# Patient Record
Sex: FEMALE | HISPANIC OR LATINO | Employment: FULL TIME | URBAN - METROPOLITAN AREA
[De-identification: names, ages, dates, MRNs, and addresses within clinical notes are randomized per-mention and may not be internally consistent; named-entity substitution may affect disease eponyms.]

---

## 2021-11-15 ENCOUNTER — HOSPITAL ENCOUNTER (EMERGENCY)
Facility: HOSPITAL | Age: 46
Discharge: HOME | End: 2021-11-15
Admitting: EMERGENCY MEDICINE
Payer: COMMERCIAL

## 2021-11-15 VITALS — TEMPERATURE: 98.5 F | RESPIRATION RATE: 16 BRPM | HEART RATE: 85 BPM | OXYGEN SATURATION: 98 %

## 2021-11-15 DIAGNOSIS — S61.211A LACERATION OF LEFT INDEX FINGER WITHOUT FOREIGN BODY WITHOUT DAMAGE TO NAIL, INITIAL ENCOUNTER: ICD-10-CM

## 2021-11-15 PROCEDURE — 99282 EMERGENCY DEPT VISIT SF MDM: CPT

## 2021-11-15 PROCEDURE — 99283 EMERGENCY DEPT VISIT LOW MDM: CPT | Mod: 25

## 2021-11-15 PROCEDURE — 90715 TDAP VACCINE 7 YRS/> IM: CPT | Performed by: EMERGENCY MEDICINE

## 2021-11-15 PROCEDURE — 272N000047 HC ADHESIVE DERMABOND SKIN

## 2021-11-15 PROCEDURE — 90471 IMMUNIZATION ADMIN: CPT | Performed by: EMERGENCY MEDICINE

## 2021-11-15 PROCEDURE — 12001 RPR S/N/AX/GEN/TRNK 2.5CM/<: CPT

## 2021-11-15 PROCEDURE — 250N000011 HC RX IP 250 OP 636: Performed by: EMERGENCY MEDICINE

## 2021-11-15 RX ADMIN — CLOSTRIDIUM TETANI TOXOID ANTIGEN (FORMALDEHYDE INACTIVATED), CORYNEBACTERIUM DIPHTHERIAE TOXOID ANTIGEN (FORMALDEHYDE INACTIVATED), BORDETELLA PERTUSSIS TOXOID ANTIGEN (GLUTARALDEHYDE INACTIVATED), BORDETELLA PERTUSSIS FILAMENTOUS HEMAGGLUTININ ANTIGEN (FORMALDEHYDE INACTIVATED), BORDETELLA PERTUSSIS PERTACTIN ANTIGEN, AND BORDETELLA PERTUSSIS FIMBRIAE 2/3 ANTIGEN 0.5 ML: 5; 2; 2.5; 5; 3; 5 INJECTION, SUSPENSION INTRAMUSCULAR at 19:43

## 2021-11-16 NOTE — DISCHARGE INSTRUCTIONS
Please do get the wound wet until tomorrow.  The glue will dissolve and the steri strip will fall off over the next week. Follow up with your primary care physician or return back to the ED for any worsening signs of infection or increasing pain.

## 2021-11-16 NOTE — ED PROVIDER NOTES
ED Provider In Triage Note  [facility]  Encounter Date: Nov 15, 2021    No chief complaint on file.      Brief HPI:   Irena Akhtar is a 46 year old female presenting to the Emergency Department for evaluation of a left index finger laceration that occurred after she accidentally cut it with scissors today at school.  The patient presented to the ED for evaluation because of persistent bleeding.  The patient denied any other injuries.  Tetanus status was unknown. The patient is otherwise healthy.     ROS: Patient denies any numbness or weakness in the left index finger.     Social: No reported alcohol use.     Brief Physical Exam:  Pulse 85   Temp 98.5  F (36.9  C)   Resp 16   LMP 10/17/2021   SpO2 98%   General presentation: Alert, Vital signs reviewed. No apparent distress.   HENT: ENT inspection is normal. Oropharynx is moist and clear.   Eye: Pupils are equal and reactive to light. EOMI  Neck: The neck is supple.  Pulmonary: Currently in no acute respiratory distress.   Circulatory: Peripheral pulses are strong and equal in the bilateral upper lower extremities.   Neurologic: Alert, oriented to person, place, and time. No gross motor or sensory deficits noted in the upper or lower extremities. Cranial nerves II through XII are grossly intact.  Musculoskeletal: No extremity tenderness. Full range of motion in all extremities. No extremity edema.   Skin: 5 mm superficial laceration noted on palmar surface of the distal tip of the left index finger.  Oozing noted from the laceration.     [facility]    -Laceration Repair    Date/Time: 11/15/2021 6:50 PM  Performed by: Maria De Jesus Jaquez DO  Authorized by: Maria De Jesus Jaquez DO       ANESTHESIA (see MAR for exact dosages):     Anesthesia method:  None  LACERATION DETAILS     Location:  Finger    Finger location:  L index finger    Length (cm):  0.5  EXPLORATION:     Hemostasis achieved with:  Direct  pressure    Wound extent: no foreign body and no tendon damage      Contaminated: no      TREATMENT:     Area cleansed with:  Saline    SKIN REPAIR     Repair method:  Tissue adhesive    APPROXIMATION     Approximation:  Close    POST-PROCEDURE DETAILS     Dressing:  Non-adherent dressing        MDM  The patient's superficial laceration was repaired here using skin glue and steri strips.  Hemostasis was achieved with the placement of the skin glue and steri strips.  The patient was educated about proper wound care and reassured. She was instructed to watch for signs of worsening infection.  The patient was given a tetanus shot here in the ED.       IMPRESSION  1. Laceration of left index finger without foreign body without damage to nail, initial encounter            Maria De Jesus Jaquez DO on 11/15/2021 at 6:50 PM    Patient was evaluated by the Physician in Triage due to a limitation of available rooms in the Emergency Department. A plan of care was discussed based on the information obtained on the initial evaluation and patient was consuled to return back to the Emergency Department lobby after this initial evalutaiton until results were obtained or a room became available in the Emergency Department. Patient was counseled not to leave prior to receiving the results of their workup.      Maria De Jesus Jaquez DO  11/15/21 1910

## 2021-11-16 NOTE — ED TRIAGE NOTES
Pt reports laceration tip of L index finger at 1300 today while working with scissors today. Not on thinners.

## 2024-11-16 ENCOUNTER — OFFICE VISIT (OUTPATIENT)
Dept: URGENT CARE | Facility: URGENT CARE | Age: 49
End: 2024-11-16
Payer: COMMERCIAL

## 2024-11-16 VITALS
HEART RATE: 104 BPM | RESPIRATION RATE: 14 BRPM | BODY MASS INDEX: 30.11 KG/M2 | SYSTOLIC BLOOD PRESSURE: 126 MMHG | TEMPERATURE: 97.9 F | WEIGHT: 170 LBS | OXYGEN SATURATION: 96 % | DIASTOLIC BLOOD PRESSURE: 83 MMHG

## 2024-11-16 DIAGNOSIS — G43.009 MIGRAINE WITHOUT AURA AND WITHOUT STATUS MIGRAINOSUS, NOT INTRACTABLE: Primary | ICD-10-CM

## 2024-11-16 DIAGNOSIS — R03.0 ELEVATED BP WITHOUT DIAGNOSIS OF HYPERTENSION: ICD-10-CM

## 2024-11-16 PROBLEM — G89.29 CHRONIC LEFT SHOULDER PAIN: Status: ACTIVE | Noted: 2023-04-21

## 2024-11-16 PROBLEM — E11.9 TYPE 2 DIABETES MELLITUS WITHOUT COMPLICATION, WITHOUT LONG-TERM CURRENT USE OF INSULIN (H): Status: ACTIVE | Noted: 2023-02-13

## 2024-11-16 PROBLEM — M25.512 CHRONIC LEFT SHOULDER PAIN: Status: ACTIVE | Noted: 2023-04-21

## 2024-11-16 RX ORDER — GLIPIZIDE 2.5 MG/1
2.5 TABLET, EXTENDED RELEASE ORAL DAILY
COMMUNITY
Start: 2024-10-11

## 2024-11-16 NOTE — PROGRESS NOTES
Patient presents with:  Hypertension  Headache: For a few days and has been checking her blood pressure and it has been  going up recently.       Clinical Decision Making:      ICD-10-CM    1. Migraine without aura and without status migrainosus, not intractable  G43.009       2. Elevated BP without diagnosis of hypertension  R03.0         49-year-old female with a history of diabetes who is here for concern of headaches and elevated blood pressure at home.  Headache is now gone after Tylenol ibuprofen that she took at home.  She reports temporal head pain associated with photosensitivity but that has resolved.  She checked her blood pressure at [facility] and found him to be 125/75 and then again 135/95.  Her vitals are normal here.  Reassured patient.  No other acute finding on exam.  I explained to patient that sometimes with headache blood pressure can increase but her values are not of concern.     There are no Patient Instructions on file for this visit.    HPI:  Irena Akhtar is a 49 year old female who presents today complaining of   Chief Complaint   Patient presents with    Hypertension    Headache     For a few days and has been checking her blood pressure and it has been  going up recently.          History obtained from the patient.    Problem List:  2023-04: Chronic left shoulder pain  2023-02: Type 2 diabetes mellitus without complication, without long-  term current use of insulin (H)      History reviewed. No pertinent past medical history.    Social History     Tobacco Use    Smoking status: Never    Smokeless tobacco: Never   Substance Use Topics    Alcohol use: No       Review of Systems  Constitutional, HEENT, cardiovascular, pulmonary, gi and gu systems are negative, except as otherwise noted.    Vitals:    11/16/24 1310   BP: 126/83   Pulse: 104   Resp: 14   Temp: 97.9  F (36.6  C)   TempSrc: Oral   SpO2: 96%   Weight: 77.1 kg (170 lb)       Physical Exam    GENERAL: healthy, alert and no  distress  HEENT: Tms normal, posterior oropharynx normal, no sinus tenderness.   NECK: no adenopathy, no asymmetry, masses, or scars and thyroid normal to palpation  RESP: lungs clear to auscultation - no rales, rhonchi or wheezes  CV: regular rate and rhythm, normal S1 S2, no S3 or S4, no murmur, click or rub  MS: no gross musculoskeletal defects noted, no edema  NEURO: Normal strength and tone, mentation intact and speech normal  PSYCH: mentation appears normal, affect normal/bright    Results:  No results found for any visits on 11/16/24.      At the end of the encounter, I discussed results, diagnosis, medications. Discussed red flags for immediate return to clinic/ER, as well as indications for follow up if no improvement. Patient understood and agreed to plan. Patient was stable for discharge.

## 2024-11-26 ENCOUNTER — OFFICE VISIT (OUTPATIENT)
Dept: INTERNAL MEDICINE | Facility: CLINIC | Age: 49
End: 2024-11-26
Payer: COMMERCIAL

## 2024-11-26 VITALS
RESPIRATION RATE: 16 BRPM | WEIGHT: 164.8 LBS | OXYGEN SATURATION: 97 % | HEIGHT: 63 IN | BODY MASS INDEX: 29.2 KG/M2 | DIASTOLIC BLOOD PRESSURE: 81 MMHG | HEART RATE: 99 BPM | TEMPERATURE: 98.1 F | SYSTOLIC BLOOD PRESSURE: 123 MMHG

## 2024-11-26 DIAGNOSIS — Z12.11 SCREEN FOR COLON CANCER: ICD-10-CM

## 2024-11-26 DIAGNOSIS — E11.9 TYPE 2 DIABETES MELLITUS WITHOUT COMPLICATION, WITHOUT LONG-TERM CURRENT USE OF INSULIN (H): Primary | ICD-10-CM

## 2024-11-26 DIAGNOSIS — Z12.31 VISIT FOR SCREENING MAMMOGRAM: ICD-10-CM

## 2024-11-26 DIAGNOSIS — Z00.00 ENCOUNTER FOR PREVENTIVE CARE: ICD-10-CM

## 2024-11-26 RX ORDER — PROCHLORPERAZINE 25 MG/1
SUPPOSITORY RECTAL
Qty: 1 EACH | Refills: 0 | Status: SHIPPED | OUTPATIENT
Start: 2024-11-26

## 2024-11-26 RX ORDER — PROCHLORPERAZINE 25 MG/1
SUPPOSITORY RECTAL
Qty: 3 EACH | Refills: 5 | Status: SHIPPED | OUTPATIENT
Start: 2024-11-26

## 2024-11-26 RX ORDER — PROCHLORPERAZINE 25 MG/1
SUPPOSITORY RECTAL
Qty: 1 EACH | Refills: 1 | Status: SHIPPED | OUTPATIENT
Start: 2024-11-26

## 2024-11-26 NOTE — PROGRESS NOTES
Assessment and Plan     Ms. Libra Akhtar is a 49 year old female with history of T2DM seen to [facility].      (E11.9) Type 2 diabetes mellitus without complication, without long-term current use of insulin (H)  (primary encounter diagnosis)  Comment:    Plan: HEMOGLOBIN A1C,   Lipid panel reflex to direct LDL Fasting,   Albumin Random Urine Quantitative with Creat Ratio,   Ophthalmology REFERRAL,   Comprehensive metabolic panel (BMP +  Alb, Alk Phos, ALT, AST, Total. Bili, TP),   Continuous Glucose  (DEXCOM G6 ) MARIN,   Continuous Glucose Sensor (DEXCOM G6 SENSOR) MISC,   Continuous Glucose Transmitter (DEXCOM G6 TRANSMITTER) MISC,     (Z00.00) Encounter for preventive care  Plan: COVID-19 12+ (PFIZER),   INFLUENZA VACCINE, SPLIT VIRUS, TRIVALENT,PF (FLUZONE\FLUARIX),   Vitamin D Deficiency  CBC with platelets,          MA Screening Bilateral w/ Mike  Colonoscopy Screening  Referral     Most Recent Immunizations   Administered Date(s) Administered    COVID-19 Monovalent 18+ (Moderna) 01/05/2022    TDAP (Adacel,Boostrix) 11/15/2021       Return to clinic/Follow-up:  As needed and with no improvement, worsening, or new symptom development.     Options for treatment and follow-up care were reviewed with the patient. She engaged in the decision making process and verbalized understanding of the options discussed and agreed with the final plan.    I spent a total of 60 minutes in the care of this pt today, including time prior to, during, and following today's office visit. This time includes reviewing the patient's chart and prior history, external notes, obtaining a history, performing an examination, interpreting test results, and evaluation and counseling the patient. This time also includes ordering medications or tests necessary in addition to communication to other member's of the patient's health care team. Time spent in documentation and care coordination is included.    Eli UMANZOR  "Yobani, ANP, AGACNP, APRN, CNP  Nurse Practitioner    __________________________    Subjective   Presenting Concern:    Ms. Libra Akhtar is a 49 year old female with history of T2DM who presents to [facility] care.        T2DM:  On Metformin 1000 mg BID  Glipizide 2.5 mg/day  2/8/23 A1C 8.3  Home BG checks 135-170.  Occassionally 220-240.      LMP:  Nov 2, 2 days, Monthly   Reports unable to conceive        Prevention  Mammogram  Colonoscopy  Pap  Vaccinations      Review of external notes as documented above       Past Medical History:  No past medical history on file.    Active Meds:  Current Outpatient Medications   Medication Sig Dispense Refill    glipiZIDE (GLUCOTROL XL) 2.5 MG 24 hr tablet Take 2.5 mg by mouth daily.      metFORMIN (GLUCOPHAGE) 1000 MG tablet Take 1 tablet by mouth 2 times daily.       No current facility-administered medications for this visit.        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  All health questionnaires were reviewed, verified and relevant information documented above.      Objective    LMP 11/01/2024   /81 (BP Location: Right arm, Patient Position: Sitting, Cuff Size: Adult Regular)   Pulse 99   Temp 98.1  F (36.7  C) (Oral)   Resp 16   Ht 1.6 m (5' 2.99\")   Wt 74.8 kg (164 lb 12.8 oz)   LMP 11/01/2024   SpO2 97%   BMI 29.20 kg/m        Physical Exam   General appearance: alert, well appearing, and in no distress  Neck: " no significant adenopathy, lymphadenopathy  Respiratory: Good air movement bilaterally, lungs clear  Cardiovascular: normal rate and regular rhythm, S1 and S2 normal, no murmurs, rubs or gallops  GI: Abdomen soft, bowel sounds present, nondistended, nontender  Musculoskeletal: No obvious joint deformity, swelling. Normal muscle tone, normal gait  Neurological: cranial nerves II through XII grossly intact, normal muscle tone, no tremors, strength 5/5 throughout, sensation to light touch intact  Extremities: no peripheral edema  Skin: normal coloration and turgor.   Psychiatric: normal mentation, affect and mood        Answers submitted by the patient for this visit:  Diabetes Visit (Submitted on 11/26/2024)  Chief Complaint: Chronic problems general questions HPI Form  Frequency of checking blood sugars:: a few times a week  What time of day are you checking your blood sugars : before meals  Have you had any blood sugars above 200?: Yes  Have you had any blood sugars below 70?: No  Hypoglycemia symptoms:: none  Diabetic concerns:: frequent infections, blood sugar frequently over 200, other  Paraesthesia present:: blurry vision  Have you had a diabetic eye exam within the last year?: No  General Questionnaire (Submitted on 11/26/2024)  Chief Complaint: Chronic problems general questions HPI Form  How many servings of fruits and vegetables do you eat daily?: 2-3  On average, how many sweetened beverages do you drink each day (Examples: soda, juice, sweet tea, etc.  Do NOT count diet or artificially sweetened beverages)?: 0  How many minutes a day do you exercise enough to make your heart beat faster?: 20 to 29  How many days a week do you exercise enough to make your heart beat faster?: 3 or less  How many days per week do you miss taking your medication?: 1  Questionnaire about: Chronic problems general questions HPI Form (Submitted on 11/26/2024)  Chief Complaint: Chronic problems general questions HPI Form

## 2024-11-26 NOTE — PROGRESS NOTES
"Subjective   Iris is a 49 year old, presenting for the following health issues:  Establish Care and Diabetes (Pt reports blood sugar is often over 200, frequent infections, blurry vision)        11/26/2024     4:07 PM   Additional Questions   Roomed by grecia gutierrez     History of Present Illness       Diabetes:   She presents for follow up of diabetes.  She is checking home blood glucose a few times a week.   She checks blood glucose before meals.  Blood glucose is sometimes over 200 and never under 70.  When her blood glucose is low, the patient is asymptomatic for confusion, blurred vision, lethargy and reports not feeling dizzy, shaky, or weak.  She is concerned about frequent infections, blood sugar frequently over 200 and other.   She is having blurry vision.  The patient has not had a diabetic eye exam in the last 12 months.          She eats 2-3 servings of fruits and vegetables daily.She consumes 0 sweetened beverage(s) daily.She exercises with enough effort to increase her heart rate 20 to 29 minutes per day.  She exercises with enough effort to increase her heart rate 3 or less days per week. She is missing 1 dose(s) of medications per week.     Objective    /81 (BP Location: Right arm, Patient Position: Sitting, Cuff Size: Adult Regular)   Pulse 99   Temp 98.1  F (36.7  C) (Oral)   Resp 16   Ht 1.6 m (5' 2.99\")   Wt 74.8 kg (164 lb 12.8 oz)   LMP 11/01/2024   SpO2 97%   BMI 29.20 kg/m    Body mass index is 29.2 kg/m .    Signed Electronically by: Eli Hilton NP    "

## 2024-11-27 ENCOUNTER — LAB (OUTPATIENT)
Dept: LAB | Facility: CLINIC | Age: 49
End: 2024-11-27
Payer: COMMERCIAL

## 2024-11-27 DIAGNOSIS — Z00.00 ENCOUNTER FOR PREVENTIVE CARE: ICD-10-CM

## 2024-11-27 DIAGNOSIS — E11.9 TYPE 2 DIABETES MELLITUS WITHOUT COMPLICATION, WITHOUT LONG-TERM CURRENT USE OF INSULIN (H): ICD-10-CM

## 2024-11-27 LAB
ALBUMIN SERPL BCG-MCNC: 4.4 G/DL (ref 3.5–5.2)
ALP SERPL-CCNC: 114 U/L (ref 40–150)
ALT SERPL W P-5'-P-CCNC: 23 U/L (ref 0–50)
ANION GAP SERPL CALCULATED.3IONS-SCNC: 11 MMOL/L (ref 7–15)
AST SERPL W P-5'-P-CCNC: 26 U/L (ref 0–45)
BILIRUB SERPL-MCNC: 0.6 MG/DL
BUN SERPL-MCNC: 10.9 MG/DL (ref 6–20)
CALCIUM SERPL-MCNC: 9.4 MG/DL (ref 8.8–10.4)
CHLORIDE SERPL-SCNC: 104 MMOL/L (ref 98–107)
CHOLEST SERPL-MCNC: 187 MG/DL
CREAT SERPL-MCNC: 0.53 MG/DL (ref 0.51–0.95)
CREAT UR-MCNC: 193 MG/DL
EGFRCR SERPLBLD CKD-EPI 2021: >90 ML/MIN/1.73M2
ERYTHROCYTE [DISTWIDTH] IN BLOOD BY AUTOMATED COUNT: 12.3 % (ref 10–15)
EST. AVERAGE GLUCOSE BLD GHB EST-MCNC: 235 MG/DL
FASTING STATUS PATIENT QL REPORTED: YES
FASTING STATUS PATIENT QL REPORTED: YES
GLUCOSE SERPL-MCNC: 190 MG/DL (ref 70–99)
HBA1C MFR BLD: 9.8 %
HCO3 SERPL-SCNC: 21 MMOL/L (ref 22–29)
HCT VFR BLD AUTO: 41.1 % (ref 35–47)
HDLC SERPL-MCNC: 53 MG/DL
HGB BLD-MCNC: 13.5 G/DL (ref 11.7–15.7)
LDLC SERPL CALC-MCNC: 114 MG/DL
MCH RBC QN AUTO: 28.4 PG (ref 26.5–33)
MCHC RBC AUTO-ENTMCNC: 32.8 G/DL (ref 31.5–36.5)
MCV RBC AUTO: 87 FL (ref 78–100)
MICROALBUMIN UR-MCNC: 34.4 MG/L
MICROALBUMIN/CREAT UR: 17.82 MG/G CR (ref 0–25)
NONHDLC SERPL-MCNC: 134 MG/DL
PLATELET # BLD AUTO: 244 10E3/UL (ref 150–450)
POTASSIUM SERPL-SCNC: 3.8 MMOL/L (ref 3.4–5.3)
PROT SERPL-MCNC: 7.8 G/DL (ref 6.4–8.3)
RBC # BLD AUTO: 4.75 10E6/UL (ref 3.8–5.2)
SODIUM SERPL-SCNC: 136 MMOL/L (ref 135–145)
TRIGL SERPL-MCNC: 98 MG/DL
VIT D+METAB SERPL-MCNC: 20 NG/ML (ref 20–50)
WBC # BLD AUTO: 9.6 10E3/UL (ref 4–11)

## 2024-11-27 PROCEDURE — 82043 UR ALBUMIN QUANTITATIVE: CPT | Performed by: NURSE PRACTITIONER

## 2024-11-27 PROCEDURE — 83036 HEMOGLOBIN GLYCOSYLATED A1C: CPT | Performed by: NURSE PRACTITIONER

## 2024-11-27 PROCEDURE — 80053 COMPREHEN METABOLIC PANEL: CPT | Performed by: PATHOLOGY

## 2024-11-27 PROCEDURE — 85027 COMPLETE CBC AUTOMATED: CPT | Performed by: PATHOLOGY

## 2024-11-27 PROCEDURE — 99000 SPECIMEN HANDLING OFFICE-LAB: CPT | Performed by: PATHOLOGY

## 2024-11-27 PROCEDURE — 80061 LIPID PANEL: CPT | Performed by: PATHOLOGY

## 2024-11-27 PROCEDURE — 82306 VITAMIN D 25 HYDROXY: CPT | Performed by: NURSE PRACTITIONER

## 2024-11-27 PROCEDURE — 36415 COLL VENOUS BLD VENIPUNCTURE: CPT | Performed by: PATHOLOGY

## 2024-12-08 ENCOUNTER — HEALTH MAINTENANCE LETTER (OUTPATIENT)
Age: 49
End: 2024-12-08

## 2024-12-10 ENCOUNTER — OFFICE VISIT (OUTPATIENT)
Dept: INTERNAL MEDICINE | Facility: CLINIC | Age: 49
End: 2024-12-10
Payer: COMMERCIAL

## 2024-12-10 VITALS
WEIGHT: 163.9 LBS | SYSTOLIC BLOOD PRESSURE: 123 MMHG | HEIGHT: 63 IN | BODY MASS INDEX: 29.04 KG/M2 | DIASTOLIC BLOOD PRESSURE: 82 MMHG | HEART RATE: 93 BPM | TEMPERATURE: 98.1 F | RESPIRATION RATE: 18 BRPM | OXYGEN SATURATION: 96 %

## 2024-12-10 DIAGNOSIS — M25.40 SWOLLEN JOINT: ICD-10-CM

## 2024-12-10 DIAGNOSIS — E11.9 TYPE 2 DIABETES MELLITUS WITHOUT COMPLICATION, WITHOUT LONG-TERM CURRENT USE OF INSULIN (H): Primary | ICD-10-CM

## 2024-12-10 NOTE — PROGRESS NOTES
Assessment and Plan   Ms. Libra Akhtar is a 49 year old female with history of T2DM seen for diabetes follow-up.     (E11.9) Type 2 diabetes mellitus without complication, without long-term current use of insulin (H)  (primary encounter diagnosis)  Comment: Uncontrolled. A1C 9.8 and trending up.   Plan:   Adult Diabetes Education  Referral,   Added semaglutide (OZEMPIC) 2 MG/3ML pen, 0.25 mg  Hemoglobin A1c           (M25.4)  Swollen joint  Comment:  R 5th DIP joint mildly swollen, minimally erythematous, no pain with palpation.  Overuse vs. arthritis vs gout.  Plan: Trial of ibuprofen 400 mg BID X 3-4 days      Health Maintenance:  Immunizations:  Due for pneumococcal vaccine  Due for mammogram  Due for colorectal cancer screening   Due for HIV and Hep C screening     Most Recent Immunizations   Administered Date(s) Administered    COVID-19 12+ (Pfizer) 11/26/2024    COVID-19 Monovalent 18+ (Moderna) 01/05/2022    Hepatitis B, Adult 03/22/2018    Influenza Vaccine >6 months,quad, PF 12/03/2018    Influenza, Split Virus, Trivalent, Pf (Fluzone\Fluarix) 11/26/2024    Pneumococcal 23 valent 12/09/2015    TDAP (Adacel,Boostrix) 11/15/2021     HTN:  BP Readings from Last 3 Encounters:   12/10/24 123/82   11/26/24 123/81   11/16/24 126/83      Lipids:   Recent Labs   Lab Test 11/27/24  0748   CHOL 187   HDL 53   *   TRIG 98     ASCVD: The 10-year ASCVD risk score (Sabrina HANEY, et al., 2019) is: 1.8%    Values used to calculate the score:      Age: 49 years      Sex: Female      Is Non- : No      Diabetic: Yes      Tobacco smoker: No      Systolic Blood Pressure: 123 mmHg      Is BP treated: No      HDL Cholesterol: 53 mg/dL      Total Cholesterol: 187 mg/dL    DM:   Lab Results   Component Value Date    A1C 9.8 11/27/2024       Return to clinic/Follow-up:  As needed and with no improvement, worsening, or new symptom development.     Options for treatment and follow-up care were  "reviewed with the patient. She engaged in the decision making process and verbalized understanding of the options discussed and agreed with the final plan.    I spent a total of 45 minutes in the care of this pt today, including time prior to, during, and following today's office visit. This time includes reviewing the patient's chart and prior history, external notes, obtaining a history, performing an examination, interpreting test results, and evaluation and counseling the patient. This time also includes ordering medications or tests necessary in addition to communication to other member's of the patient's health care team. Time spent in documentation and care coordination is included.    Cesilia Oates RN, Nurse Practitioner Student       I was present with the NPP student who participated in the service and in the documentation of the services provided.  I have verified the history and personally performed the physical exam and medical decision making, as documented by the student and edited by me.      Eli Hilton, ANP, AGACNP, APRN, CNP  Nurse Practitioner      __________________________    Subjective   Presenting Concern:    Ms. Libra Akhtar is a 49 year old female with history of T2DM who presents for diabetes follow-up.     Diabetes Follow-up:   Most recent A1C is 9.8 on 11/27  Started using Dexcom on November 30th after establishment of care  No hypoglycemic events   Average postprandial blood sugars: 190-230s  Average fasting blood sugars: 145-160s   Has noticed positive changes in her diet following her starting CGM. She is now focusing on eating \"low carb\" and notices she is eating more proteins and vegetables.   Eye doctor appointment is scheduled 2/12   Consistently takes metformin, but inconsistently takes glipizide   No acute changes in vision  No feelings of altered sensation or paresthesias   No personal history of gastroparesis or pancreatitis   No personal or family history of " medullary thyroid cancer or multiple endocrine neoplasia 2A or 2B    Right 5th Distal Interphalangeal Joint Swelling:   She notices intermittent swelling, pain and decreased ROM of her right 5th distal interphalangeal joint  Denies history of trauma or known history of arthritis or gout   Upon assessment, joint is mildly swollen and minimally erythematous with no pain upon palpation    Review of external notes as documented above     Past Medical History:  Type 2 Diabetes     Active Meds:  Current Outpatient Medications   Medication Sig Dispense Refill    Continuous Glucose  (DEXCOM G6 ) MARIN Use to read blood sugars as per 's instructions. 1 each 0    Continuous Glucose Sensor (DEXCOM G6 SENSOR) MISC Change every 10 days. 3 each 5    Continuous Glucose Transmitter (DEXCOM G6 TRANSMITTER) MISC Change every 3 months. 1 each 1    glipiZIDE (GLUCOTROL XL) 2.5 MG 24 hr tablet Take 2.5 mg by mouth daily.      metFORMIN (GLUCOPHAGE) 1000 MG tablet Take 1 tablet by mouth 2 times daily.       No current facility-administered medications for this visit.        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  Normal energy.  No fever or chills, no excessive fatigue or daytime drowsiness  Eyes: NEGATIVE for vision changes, photophobia, redness or eye problems  ENT: NEGATIVE for problems with ears, nose or throat.  Resp: NEGATIVE for cough, wheeze, shortness of breath  CV: NEGATIVE for chest pains, palpitations, exertional chest   "pressure, paroxysmal nocturnal dyspnea, or lower extremity edema  GI: NEGATIVE for poor appetite, nausea, vomiting, heartburn, abdominal pain, or change in bowel habits  : NEGATIVE for urinary frequency or dysuria  Musculoskeletal: NEGATIVE for significant muscle or joint pains  Neurologic: NEGATIVE for headaches, dizziness, numbness, tingling, or weakness  Endocrine: NEGATIVE for cold or heat intolerance, night sweats, polyuria, polydypsia, polyphagia  Hematologic: NEGATIVE for anemia, bleeding disorder, chills, fever, swollen nodes and weight loss  Skin: NEGATIVE for pigmentation, rash, bruising, concerning lesions or moles, hair changes  Psych: NEGATIVE for depression or anxiety, no sleep problems      Objective     /82 (BP Location: Right arm, Patient Position: Sitting, Cuff Size: Adult Regular)   Pulse 93   Temp 98.1  F (36.7  C) (Oral)   Resp 18   Ht 1.6 m (5' 2.99\")   Wt 74.3 kg (163 lb 14.4 oz)   LMP 11/01/2024   SpO2 96%   BMI 29.04 kg/m         LMP 11/01/2024     Physical Exam   General appearance: alert, well appearing, and in no distress  Eyes: extra-ocular movements intact, pupils equally round and reactive bilaterally, no scleral icterus  Ears: bilateral TM's and external ear canals normal  Nose: normal and patent, no erythema, discharge or polyps  Mouth: mucous membranes moist. No oral lesions. Good dentition.  Neck: no significant adenopathy, lymphadenopathy  Respiratory: Good air movement bilaterally, lungs clear,  Cardiovascular: normal rate and regular rhythm, S1 and S2 normal, no murmurs, peripheral pulses full/symmetric, no peripheral edema  GI: Abdomen soft, bowel sounds present, nondistended, nontender  Musculoskeletal: Normal gait. Right 5th distal interphalangeal joint is mildly swollen and minimally erythematous with no pain upon palpation.   Neurological: cranial nerves II through XII grossly intact.  Extremities: peripheral pulses normal, no peripheral edema  Skin: normal " coloration and turgor. No concerning lesions  Psychiatric: normal mentation, affect and mood      Answers submitted by the patient for this visit:  Diabetes Visit (Submitted on 11/26/2024)  Chief Complaint: Chronic problems general questions HPI Form  Frequency of checking blood sugars:: a few times a week  What time of day are you checking your blood sugars : before meals  Have you had any blood sugars above 200?: Yes  Have you had any blood sugars below 70?: No  Hypoglycemia symptoms:: none  Diabetic concerns:: frequent infections, blood sugar frequently over 200, other  Paraesthesia present:: blurry vision  Have you had a diabetic eye exam within the last year?: No  General Questionnaire (Submitted on 11/26/2024)  Chief Complaint: Chronic problems general questions HPI Form  How many servings of fruits and vegetables do you eat daily?: 2-3  On average, how many sweetened beverages do you drink each day (Examples: soda, juice, sweet tea, etc.  Do NOT count diet or artificially sweetened beverages)?: 0  How many minutes a day do you exercise enough to make your heart beat faster?: 20 to 29  How many days a week do you exercise enough to make your heart beat faster?: 3 or less  How many days per week do you miss taking your medication?: 1  Questionnaire about: Chronic problems general questions HPI Form (Submitted on 11/26/2024)  Chief Complaint: Chronic problems general questions HPI Form

## 2025-01-08 ENCOUNTER — APPOINTMENT (OUTPATIENT)
Dept: INTERPRETER SERVICES | Facility: CLINIC | Age: 50
End: 2025-01-08
Payer: COMMERCIAL

## 2025-01-20 ENCOUNTER — ANCILLARY PROCEDURE (OUTPATIENT)
Dept: MAMMOGRAPHY | Facility: HOSPITAL | Age: 50
End: 2025-01-20
Attending: NURSE PRACTITIONER
Payer: COMMERCIAL

## 2025-01-20 DIAGNOSIS — Z12.31 VISIT FOR SCREENING MAMMOGRAM: ICD-10-CM

## 2025-01-20 PROCEDURE — 77063 BREAST TOMOSYNTHESIS BI: CPT

## 2025-01-20 PROCEDURE — 77067 SCR MAMMO BI INCL CAD: CPT

## 2025-01-23 ENCOUNTER — ANCILLARY PROCEDURE (OUTPATIENT)
Dept: MAMMOGRAPHY | Facility: CLINIC | Age: 50
End: 2025-01-23
Attending: NURSE PRACTITIONER
Payer: COMMERCIAL

## 2025-01-23 DIAGNOSIS — R92.8 ABNORMAL MAMMOGRAM: ICD-10-CM

## 2025-01-23 PROCEDURE — 76642 ULTRASOUND BREAST LIMITED: CPT | Mod: LT

## 2025-01-23 PROCEDURE — 77065 DX MAMMO INCL CAD UNI: CPT | Mod: LT

## 2025-02-21 ENCOUNTER — LAB (OUTPATIENT)
Dept: LAB | Facility: CLINIC | Age: 50
End: 2025-02-21
Payer: COMMERCIAL

## 2025-02-21 DIAGNOSIS — E11.9 TYPE 2 DIABETES MELLITUS WITHOUT COMPLICATION, WITHOUT LONG-TERM CURRENT USE OF INSULIN (H): ICD-10-CM

## 2025-02-21 LAB
EST. AVERAGE GLUCOSE BLD GHB EST-MCNC: 151 MG/DL
HBA1C MFR BLD: 6.9 %

## 2025-02-21 PROCEDURE — 99000 SPECIMEN HANDLING OFFICE-LAB: CPT | Performed by: PATHOLOGY

## 2025-02-21 PROCEDURE — 83036 HEMOGLOBIN GLYCOSYLATED A1C: CPT | Performed by: NURSE PRACTITIONER

## 2025-02-21 PROCEDURE — 36415 COLL VENOUS BLD VENIPUNCTURE: CPT | Performed by: PATHOLOGY

## 2025-02-25 NOTE — RESULT ENCOUNTER NOTE
A1C improved with addition of semaglutide 0.25 mg/week, but not yet at goal of < 6.5.  Will contact patient and consider semaglutide increase to 0.5 mg/week.  SSmithNP

## 2025-03-30 ENCOUNTER — MYC REFILL (OUTPATIENT)
Dept: INTERNAL MEDICINE | Facility: CLINIC | Age: 50
End: 2025-03-30
Payer: COMMERCIAL

## 2025-03-30 DIAGNOSIS — E11.9 TYPE 2 DIABETES MELLITUS WITHOUT COMPLICATION, WITHOUT LONG-TERM CURRENT USE OF INSULIN (H): ICD-10-CM

## 2025-04-03 NOTE — TELEPHONE ENCOUNTER
---------------------  Last Visit Date: 12-  Future Visit Date: none  ----------------------  Refill decision: Medication unable to be refilled by RN due to: Medication not included in refill protocol policy     Last Written Prescription:  Continuous Glucose  (DEXCOM G6 ) MARIN   1 each 0 11/26/2024     Request from pharmacy:  Requested Prescriptions   Pending Prescriptions Disp Refills    Continuous Glucose  (DEXCOM G6 ) MARIN 1 each 0     Sig: Use to read blood sugars as per 's instructions.       There is no refill protocol information for this order       Continuous Glucose Sensor (DEXCOM G6 SENSOR) MISC   3 each 5 11/26/2024   Last Visit Date: 12-  Future Visit Date: none  ----------------------  Refill decision: Medication unable to be refilled by RN due to: Medication not included   in refill protocol policy       Continuous Glucose Sensor (DEXCOM G6 SENSOR) MISC 3 each 5     Sig: Change every 10 days.       There is no refill protocol information for this order         semaglutide (OZEMPIC) 2 MG/3ML pen   3 mL 2 12/10/2024 5/27/2025   Last Visit Date: 12-  Future Visit Date: none      semaglutide (OZEMPIC) 2 MG/3ML pen 3 mL 2     Sig: Inject 0.25 mg subcutaneously every 7 days.       GLP-1 Agonists Protocol Passed - 4/3/2025  3:23 PM        Passed - HgbA1C in past 3 or 6 months     If HgbA1C is 8 or greater, it needs to be on file within the past 3 months.  If less than 8, must be on file within the past 6 months.     Recent Labs   Lab Test 02/21/25  0625   A1C 6.9*             Passed - Medication is active on med list and the sig matches. RN to manually verify dose and sig if red X/fail.             Passed - Has GFR on file in past 12 months and most recent value is normal        Passed - Recent (6 mo) or future (90 days) visit within the authorizing provider's specialty     The patient must have completed an in-person or virtual visit within  the past 6 months or has a future visit scheduled within the next 90 days with the authorizing provider s specialty.  Urgent care and e-visits do not quality as an office visit for this protocol.          Passed - Medication indicated for associated diagnosis     Medication is associated with one or more of the following diagnoses:            Passed - Patient is age 18 or older        Passed - No active pregnancy on record        Passed - No positive pregnancy test in past 12 months

## 2025-04-03 NOTE — TELEPHONE ENCOUNTER
semaglutide (OZEMPIC) 2 MG/3ML pen should have refills on file at pharmacy.     Avis Munguia RN on 4/3/2025 at 5:00 PM

## 2025-04-05 RX ORDER — PROCHLORPERAZINE 25 MG/1
SUPPOSITORY RECTAL
Qty: 1 EACH | Refills: 0 | Status: SHIPPED | OUTPATIENT
Start: 2025-04-05

## 2025-04-05 RX ORDER — PROCHLORPERAZINE 25 MG/1
SUPPOSITORY RECTAL
Qty: 3 EACH | Refills: 11 | Status: SHIPPED | OUTPATIENT
Start: 2025-04-05

## 2025-06-08 ENCOUNTER — HEALTH MAINTENANCE LETTER (OUTPATIENT)
Age: 50
End: 2025-06-08

## 2025-07-29 ENCOUNTER — OFFICE VISIT (OUTPATIENT)
Dept: INTERNAL MEDICINE | Facility: CLINIC | Age: 50
End: 2025-07-29
Payer: COMMERCIAL

## 2025-07-29 VITALS
HEART RATE: 97 BPM | HEIGHT: 63 IN | DIASTOLIC BLOOD PRESSURE: 73 MMHG | RESPIRATION RATE: 16 BRPM | SYSTOLIC BLOOD PRESSURE: 111 MMHG | WEIGHT: 163.2 LBS | BODY MASS INDEX: 28.92 KG/M2 | OXYGEN SATURATION: 99 % | TEMPERATURE: 98.4 F

## 2025-07-29 DIAGNOSIS — E11.9 TYPE 2 DIABETES MELLITUS WITHOUT COMPLICATION, WITHOUT LONG-TERM CURRENT USE OF INSULIN (H): ICD-10-CM

## 2025-07-29 DIAGNOSIS — E78.5 DYSLIPIDEMIA: ICD-10-CM

## 2025-07-29 DIAGNOSIS — M10.9 ACUTE GOUT OF LEFT HAND, UNSPECIFIED CAUSE: Primary | ICD-10-CM

## 2025-07-29 PROCEDURE — 99215 OFFICE O/P EST HI 40 MIN: CPT | Performed by: NURSE PRACTITIONER

## 2025-07-29 RX ORDER — COLCHICINE 0.6 MG/1
0.6 TABLET ORAL DAILY
Qty: 23 TABLET | Refills: 1 | Status: SHIPPED | OUTPATIENT
Start: 2025-07-29 | End: 2025-09-13

## 2025-07-29 NOTE — PROGRESS NOTES
Assessment and Plan   Ms. Libra Akhtar is a 50 year old female with history of T2DM seen for follow-up    (M10.9) Acute gout of left hand, unspecified cause  (primary encounter diagnosis)  Comment: R small finger, DIP. Swelling and discomfort improving on indomethicin   Plan: colchicine (COLCRYS) 0.6 MG tablet. Take 1.2 mg today, followed by 0.6mg one hour later. Then 0.6 mg BID until 48 hrs after flare resolved.      (E11.9) Type 2 diabetes mellitus without complication, without long-term current use of insulin (H)  Comment: On metformin 2000 mg/day; Glipizide 2.5 mg; Semaglutide (Ozempic) 0.25mg/week. Out of Ozempic. Was helpful in suppressing appetite, maintaining lower A1C - would like to continue. Goal A1C < 7%  Plan: HEMOGLOBIN A1C,   Restart semaglutide (OZEMPIC) 2 MG/3ML pen  If you have a low blood sugar (under 90), hold the Glipizide that day and eat a meal with both protein and carbohydrates. Please record any episodes of this and send me a [employer] message if more than 2X/month. Keep a glucose source with you at all times if taking the Glipizide.     Adden 7/29/25 A1c 7.9, from 6.9 5 mos ago  Follow-up appointment one month    (E78.5) Dyslipidemia  Plan: Lipid panel reflex to direct LDL Fasting    Lipids:   Recent Labs   Lab Test 11/27/24  0748   CHOL 187   HDL 53   *   TRIG 98     DM:   Lab Results   Component Value Date    A1C 6.9 02/21/2025    A1C 9.8 11/27/2024     Return to clinic/Follow-up:  As needed and with no improvement, worsening, or new symptom development.     Options for treatment and follow-up care were reviewed with the patient. She engaged in the decision making process and verbalized understanding of the options discussed and agreed with the final plan.    I spent a total of 45 minutes in the care of this pt today, including time prior to, during, and following today's office visit. This time includes reviewing the patient's chart and prior history, external notes, obtaining a  history, performing an examination, interpreting test results, and evaluation and counseling the patient. This time also includes ordering medications or tests necessary in addition to communication to other member's of the patient's health care team. Time spent in documentation and care coordination is included.    Eli Hilton, ANP, AGACNP, APRN, CNP  Nurse Practitioner    __________________________    Subjective   Presenting Concern:    Gout:  Urgent care 7/18. R small finger swollen. Uric acid elevated.  Indomethacin X 5 days  Some improvement  Finger remains slightly swollen. Rates pain as 6/10    T2DM - Using dexcom  Ran out of Ozempic in May  Increased appetite since  160-180 AM   Afternoon 218    L heel soft tissue concern - had a dark area at the heel, present for 2 days. No known injury, no pain. Now resolved.     Prevention:  Pap and Colonoscopy due - declines both.  Will consider for a future appointment.     Review of external notes as documented above     Past Medical History:  No past medical history on file.    Active Meds:  Current Outpatient Medications   Medication Sig Dispense Refill    clotrimazole (LOTRIMIN) 1 % external cream Apply topically 2 times daily for 21 days. 60 g 0    Continuous Glucose  (DEXCOM G6 ) MARIN Use to read blood sugars as per 's instructions. 1 each 0    Continuous Glucose Sensor (DEXCOM G6 SENSOR) MISC Change every 10 days. 3 each 11    Continuous Glucose Transmitter (DEXCOM G6 TRANSMITTER) MISC Change every 3 months. 1 each 3    glipiZIDE (GLUCOTROL XL) 2.5 MG 24 hr tablet Take 1 tablet (2.5 mg) by mouth daily. 90 tablet 3    indomethacin (INDOCIN) 50 MG capsule Take 1 capsule (50 mg) by mouth 2 times daily (with meals) for 5 days. 10 capsule 0    metFORMIN (GLUCOPHAGE) 1000 MG tablet Take 1 tablet (1,000 mg) by mouth 2 times daily (with meals). 180 tablet 3     No current facility-administered medications for this visit.     "  Allergies:  Reviewed, refer to EMR    A full 10-pt Review of Systems was performed, verified and is negative except as documented in the HPI.  All health questionnaires were reviewed, verified and relevant information documented above.    Objective    LMP 06/29/2025 (Approximate)   /73 (BP Location: Right arm, Patient Position: Sitting, Cuff Size: Adult Regular)   Pulse 97   Temp 98.4  F (36.9  C)   Resp 16   Ht 1.6 m (5' 2.99\")   Wt 74 kg (163 lb 3.2 oz)   LMP 07/24/2025 (Approximate)   SpO2 99%   BMI 28.92 kg/m      Physical Exam   General appearance: alert, well appearing, and in no distress  Eyes: extra-ocular movements intact, pupils equally round and reactive bilaterally, no scleral icterus  Neck: no significant adenopathy, lymphadenopathy  Respiratory: Good air movement bilaterally, lungs clear  Cardiovascular: normal rate and regular rhythm, S1 and S2 normal, no murmurs, rubs or gallops, no peripheral edema  GI: Abdomen soft, bowel sounds present, nontender, no organomegaly or masses   Musculoskeletal: No obvious joint deformity, swelling. Normal muscle tone, normal gait  Neurological: cranial nerves II through XII grossly intact, no tremors, strength 5/5 throughout, sensation to light touch intact  Foot exam: No areas of discoloration or deformity. Nontender. Normal sensation. DP pulses intact   Skin: normal coloration and turgor.   Psychiatric: normal mentation, affect and mood    Answers submitted by the patient for this visit:  General Questionnaire (Submitted on 7/29/2025)  Chief Complaint: Chronic problems general questions HPI Form  What is the reason for your visit today? : diabetes foot exam, eye check, finger concerns- dark/swollen, uric acid test results  Questionnaire about: Chronic problems general questions HPI Form (Submitted on 7/29/2025)  Chief Complaint: Chronic problems general questions HPI Form    "

## 2025-07-29 NOTE — PROGRESS NOTES
"Subjective   Iris is a 50 year old, presenting for the following health issues:  Follow Up (diabetes foot exam, eye check, finger concerns- dark/swollen, uric acid test results)      7/29/2025     3:07 PM   Additional Questions   Roomed by Luisana EASON   Accompanied by N/A     History of Present Illness       Reason for visit:  Diabetes foot exam, eye check, finger concerns- dark/swollen, uric acid test results           Objective    /73 (BP Location: Right arm, Patient Position: Sitting, Cuff Size: Adult Regular)   Pulse 97   Temp 98.4  F (36.9  C)   Resp 16   Ht 1.6 m (5' 2.99\")   Wt 74 kg (163 lb 3.2 oz)   LMP 07/24/2025 (Approximate)   SpO2 99%   BMI 28.92 kg/m    Body mass index is 28.92 kg/m .    Signed Electronically by: Eli Hilton NP    "

## 2025-07-30 ENCOUNTER — LAB (OUTPATIENT)
Dept: LAB | Facility: CLINIC | Age: 50
End: 2025-07-30
Payer: COMMERCIAL

## 2025-07-30 DIAGNOSIS — E78.5 DYSLIPIDEMIA: ICD-10-CM

## 2025-07-30 DIAGNOSIS — E11.9 TYPE 2 DIABETES MELLITUS WITHOUT COMPLICATION, WITHOUT LONG-TERM CURRENT USE OF INSULIN (H): ICD-10-CM

## 2025-07-30 LAB
CHOLEST SERPL-MCNC: 175 MG/DL
EST. AVERAGE GLUCOSE BLD GHB EST-MCNC: 180 MG/DL
FASTING STATUS PATIENT QL REPORTED: YES
HBA1C MFR BLD: 7.9 %
HDLC SERPL-MCNC: 53 MG/DL
LDLC SERPL CALC-MCNC: 105 MG/DL
NONHDLC SERPL-MCNC: 122 MG/DL
TRIGL SERPL-MCNC: 85 MG/DL

## 2025-07-30 PROCEDURE — 80061 LIPID PANEL: CPT | Performed by: PATHOLOGY

## 2025-07-30 PROCEDURE — 99000 SPECIMEN HANDLING OFFICE-LAB: CPT | Performed by: PATHOLOGY

## 2025-07-30 PROCEDURE — 83036 HEMOGLOBIN GLYCOSYLATED A1C: CPT | Performed by: NURSE PRACTITIONER

## 2025-07-30 PROCEDURE — 36415 COLL VENOUS BLD VENIPUNCTURE: CPT | Performed by: PATHOLOGY
